# Patient Record
Sex: MALE | Race: WHITE
[De-identification: names, ages, dates, MRNs, and addresses within clinical notes are randomized per-mention and may not be internally consistent; named-entity substitution may affect disease eponyms.]

---

## 2017-03-02 ENCOUNTER — APPOINTMENT (OUTPATIENT)
Dept: ORTHOPEDIC SURGERY | Facility: CLINIC | Age: 57
End: 2017-03-02

## 2017-03-02 VITALS — HEIGHT: 77 IN | BODY MASS INDEX: 28.34 KG/M2 | WEIGHT: 240 LBS | RESPIRATION RATE: 16 BRPM

## 2017-03-02 DIAGNOSIS — Z87.891 PERSONAL HISTORY OF NICOTINE DEPENDENCE: ICD-10-CM

## 2017-03-02 DIAGNOSIS — M72.0 PALMAR FASCIAL FIBROMATOSIS [DUPUYTREN]: ICD-10-CM

## 2017-03-02 DIAGNOSIS — Z00.00 ENCOUNTER FOR GENERAL ADULT MEDICAL EXAMINATION W/OUT ABNORMAL FINDINGS: ICD-10-CM

## 2017-03-02 DIAGNOSIS — Z86.79 PERSONAL HISTORY OF OTHER DISEASES OF THE CIRCULATORY SYSTEM: ICD-10-CM

## 2017-07-17 ENCOUNTER — APPOINTMENT (OUTPATIENT)
Dept: ORTHOPEDIC SURGERY | Facility: CLINIC | Age: 57
End: 2017-07-17

## 2017-07-18 ENCOUNTER — APPOINTMENT (OUTPATIENT)
Dept: ORTHOPEDIC SURGERY | Facility: CLINIC | Age: 57
End: 2017-07-18

## 2017-09-08 ENCOUNTER — APPOINTMENT (OUTPATIENT)
Dept: ORTHOPEDIC SURGERY | Facility: CLINIC | Age: 57
End: 2017-09-08
Payer: COMMERCIAL

## 2017-09-08 PROCEDURE — 99214 OFFICE O/P EST MOD 30 MIN: CPT

## 2019-06-03 ENCOUNTER — APPOINTMENT (OUTPATIENT)
Dept: FAMILY MEDICINE | Facility: CLINIC | Age: 59
End: 2019-06-03
Payer: COMMERCIAL

## 2019-06-03 VITALS — SYSTOLIC BLOOD PRESSURE: 118 MMHG | DIASTOLIC BLOOD PRESSURE: 70 MMHG

## 2019-06-03 VITALS — WEIGHT: 240 LBS | HEIGHT: 77 IN | BODY MASS INDEX: 28.34 KG/M2

## 2019-06-03 DIAGNOSIS — M72.0 PALMAR FASCIAL FIBROMATOSIS [DUPUYTREN]: ICD-10-CM

## 2019-06-03 DIAGNOSIS — Z71.89 OTHER SPECIFIED COUNSELING: ICD-10-CM

## 2019-06-03 PROCEDURE — 90471 IMMUNIZATION ADMIN: CPT

## 2019-06-03 PROCEDURE — 90707 MMR VACCINE SC: CPT

## 2019-06-03 PROCEDURE — 90691 TYPHOID VACCINE IM: CPT

## 2019-06-03 PROCEDURE — 99204 OFFICE O/P NEW MOD 45 MIN: CPT | Mod: 25

## 2019-06-03 PROCEDURE — 90715 TDAP VACCINE 7 YRS/> IM: CPT

## 2019-06-03 PROCEDURE — 90472 IMMUNIZATION ADMIN EACH ADD: CPT

## 2019-06-03 RX ORDER — VALSARTAN 160 MG/1
160 TABLET ORAL
Refills: 0 | Status: ACTIVE | COMMUNITY

## 2019-06-03 RX ORDER — ATOVAQUONE AND PROGUANIL HYDROCHLORIDE 250; 100 MG/1; MG/1
250-100 TABLET, FILM COATED ORAL DAILY
Qty: 23 | Refills: 0 | Status: ACTIVE | COMMUNITY
Start: 2019-06-03 | End: 1900-01-01

## 2019-06-03 NOTE — HISTORY OF PRESENT ILLNESS
[Initial Pre-Travel Evaluation] : an initial pre-travel visit [Travel Duration ___] : will last [unfilled] [Travel Begins ___] : begins [unfilled] [Tourism] : tourism [The Country(ies) of ___] : the country(ies) of [unfilled] [Hotel] : hotel [Tent] : tent [Hepatitis A] : hepatitis A [Polio] : polio [MMR] : measles, mumps, rubella [Typhoid] : typhoid [Rabies] : no rabies [Tetanus] : no tetanus [Yellow Fever] : no yellow fever [de-identified] : 1 dose of MMR, Typoid oral 6 years ago

## 2021-11-02 ENCOUNTER — NON-APPOINTMENT (OUTPATIENT)
Age: 61
End: 2021-11-02

## 2021-11-02 ENCOUNTER — APPOINTMENT (OUTPATIENT)
Dept: CARDIOLOGY | Facility: CLINIC | Age: 61
End: 2021-11-02
Payer: COMMERCIAL

## 2021-11-02 VITALS
WEIGHT: 255 LBS | SYSTOLIC BLOOD PRESSURE: 130 MMHG | DIASTOLIC BLOOD PRESSURE: 72 MMHG | HEIGHT: 77 IN | HEART RATE: 56 BPM | BODY MASS INDEX: 30.11 KG/M2 | TEMPERATURE: 98.2 F | OXYGEN SATURATION: 100 %

## 2021-11-02 PROCEDURE — 93000 ELECTROCARDIOGRAM COMPLETE: CPT

## 2021-11-02 PROCEDURE — 99204 OFFICE O/P NEW MOD 45 MIN: CPT

## 2021-11-02 NOTE — HISTORY OF PRESENT ILLNESS
[FreeTextEntry1] : 61M with HTN, ascending aortic dilation, presents to establish cardiovascular care\par Sent in by: found online\par PMD: Dr Jory Laguna, [facility]\par \par pt had a prev cardiologist in the city, looking for someone closer to his house. \par \par pt states hx of aortic aneurysm a few years ago. pt was being treated for HTN, placed on valsartan 160 and hctz. \par pt also on lipitor 20mg\par \par pt overall feeling well. denies cp or sob, palpitations, dizziness, syncope. pt denies LE edema, orthopnea. \par  \par  \par Exercise: walking daily\par Diet: trying to eat healthy, low acid foods. no meat. \par \par Prior cardiac workup: stress test several years ago, normal per pt\par Recent labs:\par  \par EKG: SR 56\par \par  \par Med hx: hx of panic attacks, ascending aorta dilation. \par Sx hx: pilonidal cyst. \par Fam hx: no known cardiac dz\par Social hx: lives in [place] with wife. 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 no tob, eoth, drugs. \par Meds: valsartan 160 hctz 12.5 lipitor 20 nexium\par Allergies: nkda\par

## 2021-11-02 NOTE — PHYSICAL EXAM
[Well Developed] : well developed [Well Nourished] : well nourished [No Acute Distress] : no acute distress [Normal Venous Pressure] : normal venous pressure [No Carotid Bruit] : no carotid bruit [Normal S1, S2] : normal S1, S2 [No Murmur] : no murmur [Clear Lung Fields] : clear lung fields [Good Air Entry] : good air entry [No Respiratory Distress] : no respiratory distress  [Soft] : abdomen soft [Non Tender] : non-tender [Normal Gait] : normal gait [Moves all extremities] : moves all extremities [No Focal Deficits] : no focal deficits [Alert and Oriented] : alert and oriented

## 2021-11-02 NOTE — DISCUSSION/SUMMARY
[FreeTextEntry1] : \par \par 61M with HTN, ascending aortic dilation, presents to establish cardiovascular care\par \par 1. htn\par -controlled on current regimen of valsartan 160 hctz 12.5\par -cont current meds\par \par 2. Ascending aortic dilation\par -pt reports hx of ascending dilation\par -last echo several years ago\par -will check tte to eval\par -check abd u/s to eval for AAA\par \par f/u 6 months unless requires sooner.

## 2021-11-12 ENCOUNTER — NON-APPOINTMENT (OUTPATIENT)
Age: 61
End: 2021-11-12

## 2021-11-16 ENCOUNTER — APPOINTMENT (OUTPATIENT)
Dept: CARDIOLOGY | Facility: CLINIC | Age: 61
End: 2021-11-16
Payer: COMMERCIAL

## 2021-11-16 PROCEDURE — 93306 TTE W/DOPPLER COMPLETE: CPT

## 2021-11-16 PROCEDURE — 93978 VASCULAR STUDY: CPT

## 2023-01-23 ENCOUNTER — NON-APPOINTMENT (OUTPATIENT)
Age: 63
End: 2023-01-23

## 2023-01-30 ENCOUNTER — APPOINTMENT (OUTPATIENT)
Dept: OTOLARYNGOLOGY | Facility: CLINIC | Age: 63
End: 2023-01-30
Payer: COMMERCIAL

## 2023-01-30 ENCOUNTER — NON-APPOINTMENT (OUTPATIENT)
Age: 63
End: 2023-01-30

## 2023-01-30 VITALS
WEIGHT: 255 LBS | BODY MASS INDEX: 30.11 KG/M2 | SYSTOLIC BLOOD PRESSURE: 122 MMHG | HEIGHT: 77 IN | DIASTOLIC BLOOD PRESSURE: 72 MMHG | HEART RATE: 62 BPM

## 2023-01-30 DIAGNOSIS — H93.13 TINNITUS, BILATERAL: ICD-10-CM

## 2023-01-30 DIAGNOSIS — H61.22 IMPACTED CERUMEN, LEFT EAR: ICD-10-CM

## 2023-01-30 DIAGNOSIS — J31.0 CHRONIC RHINITIS: ICD-10-CM

## 2023-01-30 PROCEDURE — 99203 OFFICE O/P NEW LOW 30 MIN: CPT | Mod: 25

## 2023-01-30 PROCEDURE — 69210 REMOVE IMPACTED EAR WAX UNI: CPT

## 2023-01-30 NOTE — HISTORY OF PRESENT ILLNESS
[de-identified] : Pt presents today c/o earwax buildup that impacts his hearing. Pt notes he tried Debrox, but he thinks there's was too much wax for the peroxide to work. Pt notes he experiences slight tinnitus occasionally, which increases with salt intake, but it doesn't bother him.

## 2023-01-30 NOTE — PHYSICAL EXAM
[Hearing Basurto Test (Tuning Fork On Forehead)] : no lateralization of tone [Normal] : mucosa is normal [Midline] : trachea located in midline position [Removed] : palatine tonsils previously removed [FreeTextEntry8] : cerumen removed via curettage  [FreeTextEntry9] : cerumen impaction removed via curettage  [de-identified] : inflamed turbs  [FreeTextEntry2] : sinuses nontender to percussion. sensations intact.

## 2023-01-30 NOTE — ASSESSMENT
[FreeTextEntry1] : Reviewed and reconciled medications, allergies, PMHx, PSHx, SocHx, FMHx.\par \par Pt presents today c/o earwax buildup that impacts his hearing. \par \par Physical Exam -\par cerumen removed b/l - left impacted\par inflamed turbs \par \par Plan:\par Cerumen removed b/l. FU 1 year.

## 2023-01-30 NOTE — ADDENDUM
[FreeTextEntry1] : Documented by Yenifer Benito acting as scribe for Dr. Vicente on 01/30/2023.\par \par All Medical record entries made by the scribe were at my, Dr. Vicente,direction and personally dictated by me on 01/30/2023. I have reviewed the chart and agree that the record accurately reflects my personal performance of the history, physical exam, assessment and plan. I have also personally directed, reviewed, and agreed with the discharge instructions.

## 2023-01-30 NOTE — CONSULT LETTER
[Dear  ___] : Dear  [unfilled], [Consult Letter:] : I had the pleasure of evaluating your patient, [unfilled]. [Please see my note below.] : Please see my note below. [Consult Closing:] : Thank you very much for allowing me to participate in the care of this patient.  If you have any questions, please do not hesitate to contact me. [Sincerely,] : Sincerely, [FreeTextEntry3] : Santi Vicente MD FACS

## 2023-02-16 ENCOUNTER — NON-APPOINTMENT (OUTPATIENT)
Age: 63
End: 2023-02-16

## 2023-02-16 ENCOUNTER — APPOINTMENT (OUTPATIENT)
Dept: CARDIOLOGY | Facility: CLINIC | Age: 63
End: 2023-02-16
Payer: COMMERCIAL

## 2023-02-16 VITALS
OXYGEN SATURATION: 99 % | HEIGHT: 77 IN | BODY MASS INDEX: 30.71 KG/M2 | SYSTOLIC BLOOD PRESSURE: 128 MMHG | HEART RATE: 57 BPM | DIASTOLIC BLOOD PRESSURE: 80 MMHG | TEMPERATURE: 97.9 F | WEIGHT: 260.06 LBS | RESPIRATION RATE: 16 BRPM

## 2023-02-16 PROCEDURE — 93000 ELECTROCARDIOGRAM COMPLETE: CPT

## 2023-02-16 PROCEDURE — 99215 OFFICE O/P EST HI 40 MIN: CPT

## 2023-02-16 NOTE — DISCUSSION/SUMMARY
[FreeTextEntry1] : 62M with HTN, ascending aortic dilation, presents for f/u\par \par pt feeling well\par no complaints\par exercising daily without issues\par bp controlled, cont current regimen\par will repeat tte to eval aorta\par \par f/u 6 months unless requires sooner\par >40 min spent on complete encounter [EKG obtained to assist in diagnosis and management of assessed problem(s)] : EKG obtained to assist in diagnosis and management of assessed problem(s)

## 2023-02-16 NOTE — HISTORY OF PRESENT ILLNESS
[FreeTextEntry1] : 62M with HTN, ascending aortic dilation, presents for f/u\par PMD: Dr Jory Laguna, [facility]\par \par previously,\par pt last seen here 11/2021 for an initial eval.  pt w/ hx of aortic aneurysm, lost to routine f/u. pt was being treated for HTN, placed on valsartan 160 and hctz. pt also on lipitor 20mg. tte done at that time showing Ao 4.2 cm. \par pt has not been seen since 11/2021.\par \par pt now presents for follow up.\par today,\par \par pt overall feeling well. denies cp or sob, palpitations, dizziness, syncope. pt denies LE edema, orthopnea. \par  \par  \par Exercise: [place], no symptoms. \par Diet: trying to eat healthy, low acid foods. no meat. \par \par Prior cardiac workup: stress test several years ago, normal per pt\par Recent labs:\par  \par EKG: SR 58\par \par  \par Med hx: hx of panic attacks, ascending aorta dilation. \par Sx hx: pilonidal cyst. \par Fam hx: no known cardiac dz\par Social hx: lives in [place] with wife. 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 no tob, eoth, drugs. \par Meds: valsartan 160 hctz 12.5 lipitor 20 nexium\par Allergies: nkda\par

## 2023-02-21 ENCOUNTER — TRANSCRIPTION ENCOUNTER (OUTPATIENT)
Age: 63
End: 2023-02-21

## 2023-02-28 ENCOUNTER — APPOINTMENT (OUTPATIENT)
Dept: CARDIOLOGY | Facility: CLINIC | Age: 63
End: 2023-02-28
Payer: COMMERCIAL

## 2023-02-28 PROCEDURE — 93306 TTE W/DOPPLER COMPLETE: CPT

## 2023-08-22 ENCOUNTER — APPOINTMENT (OUTPATIENT)
Dept: CARDIOLOGY | Facility: CLINIC | Age: 63
End: 2023-08-22
Payer: COMMERCIAL

## 2023-08-22 ENCOUNTER — NON-APPOINTMENT (OUTPATIENT)
Age: 63
End: 2023-08-22

## 2023-08-22 VITALS
DIASTOLIC BLOOD PRESSURE: 68 MMHG | TEMPERATURE: 97.9 F | BODY MASS INDEX: 30.34 KG/M2 | SYSTOLIC BLOOD PRESSURE: 117 MMHG | OXYGEN SATURATION: 99 % | HEART RATE: 61 BPM | HEIGHT: 77 IN | WEIGHT: 257 LBS

## 2023-08-22 DIAGNOSIS — I77.810 THORACIC AORTIC ECTASIA: ICD-10-CM

## 2023-08-22 DIAGNOSIS — I10 ESSENTIAL (PRIMARY) HYPERTENSION: ICD-10-CM

## 2023-08-22 DIAGNOSIS — R00.2 PALPITATIONS: ICD-10-CM

## 2023-08-22 PROCEDURE — 93000 ELECTROCARDIOGRAM COMPLETE: CPT

## 2023-08-22 PROCEDURE — 99215 OFFICE O/P EST HI 40 MIN: CPT

## 2023-08-22 NOTE — DISCUSSION/SUMMARY
[FreeTextEntry1] : 62M with HTN, ascending aortic dilation, presents for f/u  pt feeling well exercising daily without issues bp controlled, cont current regimen occasional palpitations, apple watch showing afib ekg today with SR will check extended holter to assess for arrythmia  f/u after holter.  >40 min spent on complete encounter [EKG obtained to assist in diagnosis and management of assessed problem(s)] : EKG obtained to assist in diagnosis and management of assessed problem(s)

## 2023-08-22 NOTE — HISTORY OF PRESENT ILLNESS
[FreeTextEntry1] : 62M with HTN, ascending aortic dilation, presents for f/u PMD: Dr Jory Laguna [facility]  previously, pt last seen here 11/2021 for an initial eval. pt w/ hx of aortic aneurysm, lost to routine f/u. pt was being treated for HTN, placed on valsartan 160 and hctz. pt also on lipitor 20mg. tte done at that time showing Ao 4.2 cm. pt has not been seen since 11/2021 last seen 2/23, feelling well.  exercising daily without issues. repeat tte for tortuous aorta showing no change from prev.   pt now presents for follow up. today,  pt states he is feeling well, no issues, no symptoms. walking regularly, no complaints. pt states his apple watch told him that he was having afib. pt states occasional palpitations, have always ignored it. states this time he had it after drinking some Gatorade.  pt states it lasted for a few minutes and then resolved on its own.  Rythm strip from watch from episode showing afib.    pt overall feeling well. denies cp or sob, palpitations, dizziness, syncope. pt denies LE edema, orthopnea.  Exercise: walking daily 4-7 miles [place] the beach, no symptoms. Diet: trying to eat healthy, low acid foods. no meat.  Prior cardiac workup: stress test several years ago, normal per pt Recent labs:  EKG: SR 54  Med hx: hx of panic attacks, ascending aorta dilation. Sx hx: pilonidal cyst. Fam hx: no known cardiac dz Social hx: lives in [place] with wife. 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 no tob, eoth, drugs. Meds: valsartan 160 hctz 12.5 lipitor 20 nexium Allergies: nkda

## 2023-08-22 NOTE — REVIEW OF SYSTEMS
[Fever] : no fever [Headache] : no headache [Weight Gain (___ Lbs)] : no recent weight gain [Chills] : no chills [Feeling Fatigued] : not feeling fatigued [Weight Loss (___ Lbs)] : no recent weight loss [Blurry Vision] : no blurred vision [Sore Throat] : no sore throat [SOB] : no shortness of breath [Dyspnea on exertion] : not dyspnea during exertion [Chest Discomfort] : no chest discomfort [Lower Ext Edema] : no extremity edema [Palpitations] : palpitations [Orthopnea] : no orthopnea [Syncope] : no syncope [Cough] : no cough [Wheezing] : no wheezing [Nausea] : no nausea [Vomiting] : no vomiting [Dizziness] : no dizziness [Confusion] : no confusion was observed [Easy Bleeding] : no tendency for easy bleeding [Easy Bruising] : no tendency for easy bruising

## 2024-04-15 RX ORDER — VALSARTAN AND HYDROCHLOROTHIAZIDE 160; 12.5 MG/1; MG/1
160-12.5 TABLET, FILM COATED ORAL
Qty: 90 | Refills: 1 | Status: ACTIVE | COMMUNITY
Start: 2024-04-12 | End: 1900-01-01

## 2024-04-16 ENCOUNTER — TRANSCRIPTION ENCOUNTER (OUTPATIENT)
Age: 64
End: 2024-04-16

## 2024-04-16 RX ORDER — ATORVASTATIN CALCIUM 20 MG/1
20 TABLET, FILM COATED ORAL
Qty: 90 | Refills: 1 | Status: ACTIVE | COMMUNITY
Start: 1900-01-01 | End: 1900-01-01

## 2024-08-28 ENCOUNTER — APPOINTMENT (OUTPATIENT)
Dept: CARDIOLOGY | Facility: CLINIC | Age: 64
End: 2024-08-28
Payer: COMMERCIAL

## 2024-08-28 ENCOUNTER — NON-APPOINTMENT (OUTPATIENT)
Age: 64
End: 2024-08-28

## 2024-08-28 VITALS
TEMPERATURE: 98.6 F | HEART RATE: 67 BPM | WEIGHT: 253 LBS | BODY MASS INDEX: 29.87 KG/M2 | SYSTOLIC BLOOD PRESSURE: 128 MMHG | DIASTOLIC BLOOD PRESSURE: 75 MMHG | HEIGHT: 77 IN | OXYGEN SATURATION: 98 %

## 2024-08-28 DIAGNOSIS — I77.810 THORACIC AORTIC ECTASIA: ICD-10-CM

## 2024-08-28 PROCEDURE — 99215 OFFICE O/P EST HI 40 MIN: CPT

## 2024-08-28 PROCEDURE — G2211 COMPLEX E/M VISIT ADD ON: CPT | Mod: NC

## 2024-08-28 NOTE — DISCUSSION/SUMMARY
[FreeTextEntry1] : 63M with HTN, ascending aortic dilation, afib presents for f/u  pt feeling well exercising daily without issues bp controlled, cont current regimen occasional palpitations, apple watch showing afib ekg today with SR given symptomatic nature of symptoms, will refer to EP  f/u after ep eval 40 min spent on complete encounter

## 2024-08-28 NOTE — HISTORY OF PRESENT ILLNESS
[FreeTextEntry1] : 63M with HTN, ascending aortic dilation, afib presents for f/u PMD: Dr Jory Laguna [facility]  previously, pt last seen here 11/2021 for an initial eval. pt w/ hx of aortic aneurysm, lost to routine f/u. pt was being treated for HTN, placed on valsartan 160 and hctz. pt also on lipitor 20mg. tte done at that time showing Ao 4.2 cm. pt has not been seen since 11/2021 2/23, feeling well. exercising daily without issues. repeat tte for tortuous aorta showing no change from prev. last seen 8/23, feeling well. zio showing no afib but 1 run of NSVT 9 beats  pt now presents for follow up. today,  pt states this morning he had a sensation in his chest, similar to his prev afib events and he took an ekg on his apple watch which showed HR up to 170s with afib. whole episode lasted until noon today. (pt states increased caffeine intake last night (more than his baseline)  currently pt feeling well, no complaints. denies cp or sob, palpitations, dizziness, syncope. pt denies LE edema, orthopnea.  Exercise: walking daily 4[place]7 miles [place] the beach, no symptoms. Diet: trying to eat healthy, low acid foods. no meat.  Prior cardiac workup: stress test several years ago, normal per pt Recent labs:  EKG: SR 68  Med hx: hx of panic attacks, ascending aorta dilation. Sx hx: pilonidal cyst. Fam hx: no known cardiac dz Social hx: lives in [place] with wife. 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 no tob, eoth, drugs. Meds: valsartan 160 hctz 12.5 lipitor 20 nexium Allergies: nkda

## 2024-08-29 ENCOUNTER — NON-APPOINTMENT (OUTPATIENT)
Age: 64
End: 2024-08-29

## 2024-08-30 ENCOUNTER — APPOINTMENT (OUTPATIENT)
Dept: ELECTROPHYSIOLOGY | Facility: CLINIC | Age: 64
End: 2024-08-30
Payer: COMMERCIAL

## 2024-08-30 VITALS
HEART RATE: 68 BPM | WEIGHT: 253 LBS | BODY MASS INDEX: 29.87 KG/M2 | SYSTOLIC BLOOD PRESSURE: 104 MMHG | HEIGHT: 77 IN | OXYGEN SATURATION: 98 % | DIASTOLIC BLOOD PRESSURE: 71 MMHG

## 2024-08-30 PROCEDURE — 93000 ELECTROCARDIOGRAM COMPLETE: CPT

## 2024-08-30 PROCEDURE — 99204 OFFICE O/P NEW MOD 45 MIN: CPT

## 2024-08-30 NOTE — CARDIOLOGY SUMMARY
[de-identified] : ECG from 8/28/2024: Sinus at 68bpm [de-identified] : TTE from 2/28/2023: LV not well visualized, EF: 60-65%, concentric LV remodeling, normal LA, mild dilation of the proximal ascending aorta, normal RA, no pericardial effusion

## 2024-08-30 NOTE — HISTORY OF PRESENT ILLNESS
[FreeTextEntry1] : Mr. Nehemiah Juarez is a 63-year-old man with an ascending aortic aneurysm and paroxysmal atrial fibrillation. He presents today for an initial evaluation.  The patient reports that he has had episodes of atrial fibrillation three times in the past year. The longest episode lasted approximately 12 hours. Each of the three episodes spontaneously terminated. He is listed as having a history of hypertension. The patient reports that his antihypertensive medication was prescribed due to his ascending aortic aneurysm. His symptoms during atrial fibrillation include indigestion, palpitations. He has confirmed that he is in atrial fibrillation using his Apple watch. On my review of one of the available tracings I agree with the diagnosis. He has also had two episodes of dizziness in the past year. These occurred while standing for an hour. He works as a . No toxic habits. No reports of snoring.   CHASA2-VASC: 0-1 - he will turn 64 in one month

## 2024-08-30 NOTE — PHYSICAL EXAM
[Well Developed] : well developed [Well Nourished] : well nourished [No Acute Distress] : no acute distress [Normal Venous Pressure] : normal venous pressure [Normal S1, S2] : normal S1, S2 [No Murmur] : no murmur [No Rub] : no rub [No Gallop] : no gallop [Clear Lung Fields] : clear lung fields [Good Air Entry] : good air entry [No Respiratory Distress] : no respiratory distress  [Soft] : abdomen soft [Non Tender] : non-tender [No Edema] : no edema [No Cyanosis] : no cyanosis [No Rash] : no rash [Moves all extremities] : moves all extremities [Normal Speech] : normal speech [Alert and Oriented] : alert and oriented [Normal memory] : normal memory

## 2024-08-30 NOTE — CARDIOLOGY SUMMARY
[de-identified] : ECG from 8/28/2024: Sinus at 68bpm [de-identified] : TTE from 2/28/2023: LV not well visualized, EF: 60-65%, concentric LV remodeling, normal LA, mild dilation of the proximal ascending aorta, normal RA, no pericardial effusion

## 2024-08-30 NOTE — DISCUSSION/SUMMARY
[FreeTextEntry1] : Mr. Nehemiah Juarez is a 63-year-old man with an ascending aortic aneurysm and paroxysmal atrial fibrillation. He presents today for an initial evaluation.  With regards to the patient's symptomatic paroxysmal in the setting of preserved left ventricular systolic function, we discussed the following management strategies: 1) observation and ventricular rate control only, 2) antiarrhythmic drugs to suppress AF, or 3) catheter ablation of the AF. We also discussed risk factor modification, including assessment and treatment of possible sleep apnea (no reports of snoring, sleep study not ordered), managing stress / weight, and limiting caffeine intake. He does not drink alcohol.  We discussed that antiarrhythmic drug therapy is expected to reduce AF recurrences by approximately 50% in all-comers, although this number may be higher or lower for individual patients and trials of multiple drugs may be necessary to find the optimal medications. Side effects of these medications including the risk of proarrhythmic effects were reviewed.     We reviewed the risks and benefits of catheter ablation. The procedure would likely result in a significant reduction in AF burden, with the chances of AF remission following a single procedure at approximately 80% at 1 year. In the long-term, risk factor modification, repeat ablation, and/or antiarrhythmic drug therapy may be necessary to suppress AF recurrences due to the complex nature of AF mechanisms and sometimes new triggers or drivers of AF. I also explained that in addition to complications related to anesthesia, the ablation procedure carries a 1% risk of complications including, but not limited to: vascular injury, VTE, MI, cardiac perforation There is also a 0.2% risk of a potentially catastrophic complication including stroke or even death. We also reviewed the need for anticoagulation both before and after the ablation.   Mr. Juarez will call the office if he decides to move forward with an ablation or would like to start an antiarrhythmic medication. He will continue to monitor his burden of atrial fibrillation using his Apple watch.  [EKG obtained to assist in diagnosis and management of assessed problem(s)] : EKG obtained to assist in diagnosis and management of assessed problem(s)

## 2024-08-30 NOTE — DISCUSSION/SUMMARY
[EKG obtained to assist in diagnosis and management of assessed problem(s)] : EKG obtained to assist in diagnosis and management of assessed problem(s) [FreeTextEntry1] : Mr. Nehemiah Juarez is a 63-year-old man with an ascending aortic aneurysm and paroxysmal atrial fibrillation. He presents today for an initial evaluation.  With regards to the patient's symptomatic paroxysmal in the setting of preserved left ventricular systolic function, we discussed the following management strategies: 1) observation and ventricular rate control only, 2) antiarrhythmic drugs to suppress AF, or 3) catheter ablation of the AF. We also discussed risk factor modification, including assessment and treatment of possible sleep apnea (no reports of snoring, sleep study not ordered), managing stress / weight, and limiting caffeine intake. He does not drink alcohol.  We discussed that antiarrhythmic drug therapy is expected to reduce AF recurrences by approximately 50% in all-comers, although this number may be higher or lower for individual patients and trials of multiple drugs may be necessary to find the optimal medications. Side effects of these medications including the risk of proarrhythmic effects were reviewed.     We reviewed the risks and benefits of catheter ablation. The procedure would likely result in a significant reduction in AF burden, with the chances of AF remission following a single procedure at approximately 80% at 1 year. In the long-term, risk factor modification, repeat ablation, and/or antiarrhythmic drug therapy may be necessary to suppress AF recurrences due to the complex nature of AF mechanisms and sometimes new triggers or drivers of AF. I also explained that in addition to complications related to anesthesia, the ablation procedure carries a 1% risk of complications including, but not limited to: vascular injury, VTE, MI, cardiac perforation There is also a 0.2% risk of a potentially catastrophic complication including stroke or even death. We also reviewed the need for anticoagulation both before and after the ablation.   Mr. Juarez will call the office if he decides to move forward with an ablation or would like to start an antiarrhythmic medication. He will continue to monitor his burden of atrial fibrillation using his Apple watch.

## 2024-09-04 ENCOUNTER — NON-APPOINTMENT (OUTPATIENT)
Age: 64
End: 2024-09-04

## 2024-09-04 ENCOUNTER — APPOINTMENT (OUTPATIENT)
Dept: CARDIOLOGY | Facility: CLINIC | Age: 64
End: 2024-09-04
Payer: COMMERCIAL

## 2024-09-04 DIAGNOSIS — R00.2 PALPITATIONS: ICD-10-CM

## 2024-09-04 DIAGNOSIS — I48.0 PAROXYSMAL ATRIAL FIBRILLATION: ICD-10-CM

## 2024-09-04 DIAGNOSIS — I10 ESSENTIAL (PRIMARY) HYPERTENSION: ICD-10-CM

## 2024-09-04 PROCEDURE — 99215 OFFICE O/P EST HI 40 MIN: CPT

## 2024-09-04 PROCEDURE — G2211 COMPLEX E/M VISIT ADD ON: CPT | Mod: NC

## 2024-09-04 PROCEDURE — 93000 ELECTROCARDIOGRAM COMPLETE: CPT

## 2024-09-04 NOTE — HISTORY OF PRESENT ILLNESS
[FreeTextEntry1] : 63M with HTN, ascending aortic dilation, afib presents for f/u PMD: Dr Jory Laguna, [facility] EP: Dr Marroquin   previously, pt last seen here 11/2021 for an initial eval. pt w/ hx of aortic aneurysm, lost to routine f/u. pt was being treated for HTN, placed on valsartan 160 and hctz. pt also on lipitor 20mg. tte done at that time showing Ao 4.2 cm. pt has not been seen since 11/2021 2/23, feeling well. exercising daily without issues. repeat tte for tortuous aorta showing no change from prev. 8/23, feeling well. zio showing no afib but 1 run of NSVT 9 beats last seen 8/24 endorsing palpitations, EKG showing SR, pt sent to EPS, offered meds/ablation, pt wishes to consider all options, but will hold off on any decisions for now  pt now presents for follow up. today,  pt states he has been noting his HR has been high occasionally over the past few days, no cp sob palpitations dizziness syncope. pt only endorsing anxiety. denies having a sensation in his chest like he has had previously, (see anthony)   currently pt feeling well, no complaints. denies cp or sob, palpitations, dizziness, syncope. pt denies LE edema, orthopnea.  Exercise: walking daily [place], no symptoms. Diet: trying to eat healthy, low acid foods. no meat.  Prior cardiac workup: stress test several years ago, normal per pt Recent labs:  EKG: SR 63  Med hx: hx of panic attacks, ascending aorta dilation. Sx hx: pilonidal cyst. Fam hx: no known cardiac dz Social hx: lives in [place] with wife. 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 no tob, eoth, drugs. Meds: valsartan 160 hctz 12.5 lipitor 20 nexium Allergies: nkda

## 2024-09-04 NOTE — DISCUSSION/SUMMARY
[FreeTextEntry1] : 63M with HTN, ascending aortic dilation, afib presents for f/u  pt feeling well exercising daily without issues bp controlled, cont current regimen occasional palpitations, apple watch showing afib ekg today with SR following with ep, considering meds vs ablation   f/u 3 months 40 min spent on complete encounter [EKG obtained to assist in diagnosis and management of assessed problem(s)] : EKG obtained to assist in diagnosis and management of assessed problem(s)

## 2024-10-09 ENCOUNTER — RX RENEWAL (OUTPATIENT)
Age: 64
End: 2024-10-09

## 2024-12-06 ENCOUNTER — APPOINTMENT (OUTPATIENT)
Dept: CARDIOLOGY | Facility: CLINIC | Age: 64
End: 2024-12-06
Payer: COMMERCIAL

## 2024-12-06 VITALS
HEART RATE: 76 BPM | OXYGEN SATURATION: 97 % | BODY MASS INDEX: 27.98 KG/M2 | WEIGHT: 237 LBS | RESPIRATION RATE: 16 BRPM | DIASTOLIC BLOOD PRESSURE: 67 MMHG | SYSTOLIC BLOOD PRESSURE: 110 MMHG | HEIGHT: 77 IN | TEMPERATURE: 97.7 F

## 2024-12-06 DIAGNOSIS — I48.0 PAROXYSMAL ATRIAL FIBRILLATION: ICD-10-CM

## 2024-12-06 DIAGNOSIS — I77.810 THORACIC AORTIC ECTASIA: ICD-10-CM

## 2024-12-06 DIAGNOSIS — I10 ESSENTIAL (PRIMARY) HYPERTENSION: ICD-10-CM

## 2024-12-06 PROCEDURE — G2211 COMPLEX E/M VISIT ADD ON: CPT | Mod: NC

## 2024-12-06 PROCEDURE — 99215 OFFICE O/P EST HI 40 MIN: CPT

## 2025-01-03 ENCOUNTER — APPOINTMENT (OUTPATIENT)
Dept: ELECTROPHYSIOLOGY | Facility: CLINIC | Age: 65
End: 2025-01-03
Payer: MEDICARE

## 2025-01-03 ENCOUNTER — NON-APPOINTMENT (OUTPATIENT)
Age: 65
End: 2025-01-03

## 2025-01-03 VITALS
WEIGHT: 240 LBS | HEART RATE: 66 BPM | OXYGEN SATURATION: 98 % | SYSTOLIC BLOOD PRESSURE: 111 MMHG | DIASTOLIC BLOOD PRESSURE: 72 MMHG | BODY MASS INDEX: 28.34 KG/M2 | HEIGHT: 77 IN

## 2025-01-03 DIAGNOSIS — I48.0 PAROXYSMAL ATRIAL FIBRILLATION: ICD-10-CM

## 2025-01-03 PROCEDURE — 93000 ELECTROCARDIOGRAM COMPLETE: CPT

## 2025-01-03 PROCEDURE — 99205 OFFICE O/P NEW HI 60 MIN: CPT

## 2025-01-03 RX ORDER — ASPIRIN ENTERIC COATED TABLETS 81 MG 81 MG/1
81 TABLET, DELAYED RELEASE ORAL
Qty: 90 | Refills: 3 | Status: ACTIVE | COMMUNITY
Start: 2025-01-03

## 2025-01-03 RX ORDER — APIXABAN 5 MG/1
5 TABLET, FILM COATED ORAL
Qty: 180 | Refills: 2 | Status: ACTIVE | COMMUNITY
Start: 2025-01-03 | End: 1900-01-01

## 2025-01-13 ENCOUNTER — APPOINTMENT (OUTPATIENT)
Dept: CARDIOLOGY | Facility: CLINIC | Age: 65
End: 2025-01-13
Payer: COMMERCIAL

## 2025-01-13 PROCEDURE — 93306 TTE W/DOPPLER COMPLETE: CPT

## 2025-02-05 ENCOUNTER — NON-APPOINTMENT (OUTPATIENT)
Age: 65
End: 2025-02-05

## 2025-02-14 ENCOUNTER — OUTPATIENT (OUTPATIENT)
Dept: OUTPATIENT SERVICES | Facility: HOSPITAL | Age: 65
LOS: 1 days | End: 2025-02-14
Payer: MEDICARE

## 2025-02-14 ENCOUNTER — NON-APPOINTMENT (OUTPATIENT)
Age: 65
End: 2025-02-14

## 2025-02-14 VITALS
OXYGEN SATURATION: 96 % | RESPIRATION RATE: 16 BRPM | SYSTOLIC BLOOD PRESSURE: 144 MMHG | HEART RATE: 66 BPM | HEIGHT: 77 IN | DIASTOLIC BLOOD PRESSURE: 74 MMHG | WEIGHT: 242.95 LBS | TEMPERATURE: 98 F

## 2025-02-14 DIAGNOSIS — Z98.890 OTHER SPECIFIED POSTPROCEDURAL STATES: Chronic | ICD-10-CM

## 2025-02-14 DIAGNOSIS — Z01.818 ENCOUNTER FOR OTHER PREPROCEDURAL EXAMINATION: ICD-10-CM

## 2025-02-14 DIAGNOSIS — I48.0 PAROXYSMAL ATRIAL FIBRILLATION: ICD-10-CM

## 2025-02-14 DIAGNOSIS — I10 ESSENTIAL (PRIMARY) HYPERTENSION: ICD-10-CM

## 2025-02-14 LAB
ANION GAP SERPL CALC-SCNC: 12 MMOL/L — SIGNIFICANT CHANGE UP (ref 5–17)
BLD GP AB SCN SERPL QL: NEGATIVE — SIGNIFICANT CHANGE UP
BUN SERPL-MCNC: 12 MG/DL — SIGNIFICANT CHANGE UP (ref 7–23)
CALCIUM SERPL-MCNC: 9.8 MG/DL — SIGNIFICANT CHANGE UP (ref 8.4–10.5)
CHLORIDE SERPL-SCNC: 102 MMOL/L — SIGNIFICANT CHANGE UP (ref 96–108)
CO2 SERPL-SCNC: 26 MMOL/L — SIGNIFICANT CHANGE UP (ref 22–31)
CREAT SERPL-MCNC: 1.09 MG/DL — SIGNIFICANT CHANGE UP (ref 0.5–1.3)
EGFR: 75 ML/MIN/1.73M2 — SIGNIFICANT CHANGE UP
GLUCOSE SERPL-MCNC: 100 MG/DL — HIGH (ref 70–99)
HCT VFR BLD CALC: 40.8 % — SIGNIFICANT CHANGE UP (ref 39–50)
HGB BLD-MCNC: 13.8 G/DL — SIGNIFICANT CHANGE UP (ref 13–17)
MCHC RBC-ENTMCNC: 31.6 PG — SIGNIFICANT CHANGE UP (ref 27–34)
MCHC RBC-ENTMCNC: 33.8 G/DL — SIGNIFICANT CHANGE UP (ref 32–36)
MCV RBC AUTO: 93.4 FL — SIGNIFICANT CHANGE UP (ref 80–100)
NRBC BLD AUTO-RTO: 0 /100 WBCS — SIGNIFICANT CHANGE UP (ref 0–0)
PLATELET # BLD AUTO: 168 K/UL — SIGNIFICANT CHANGE UP (ref 150–400)
POTASSIUM SERPL-MCNC: 4 MMOL/L — SIGNIFICANT CHANGE UP (ref 3.5–5.3)
POTASSIUM SERPL-SCNC: 4 MMOL/L — SIGNIFICANT CHANGE UP (ref 3.5–5.3)
RBC # BLD: 4.37 M/UL — SIGNIFICANT CHANGE UP (ref 4.2–5.8)
RBC # FLD: 12 % — SIGNIFICANT CHANGE UP (ref 10.3–14.5)
RH IG SCN BLD-IMP: POSITIVE — SIGNIFICANT CHANGE UP
SODIUM SERPL-SCNC: 140 MMOL/L — SIGNIFICANT CHANGE UP (ref 135–145)
WBC # BLD: 3.33 K/UL — LOW (ref 3.8–10.5)
WBC # FLD AUTO: 3.33 K/UL — LOW (ref 3.8–10.5)

## 2025-02-14 PROCEDURE — 85027 COMPLETE CBC AUTOMATED: CPT

## 2025-02-14 PROCEDURE — G0463: CPT

## 2025-02-14 PROCEDURE — 86901 BLOOD TYPING SEROLOGIC RH(D): CPT

## 2025-02-14 PROCEDURE — 80048 BASIC METABOLIC PNL TOTAL CA: CPT

## 2025-02-14 PROCEDURE — 86850 RBC ANTIBODY SCREEN: CPT

## 2025-02-14 PROCEDURE — 86900 BLOOD TYPING SEROLOGIC ABO: CPT

## 2025-02-14 NOTE — H&P PST ADULT - ASSESSMENT
DASI score: 7.8  DASI activity: walking, goes to gym twice a day  Loose teeth or denture: no  MP: 2

## 2025-02-14 NOTE — H&P PST ADULT - HISTORY OF PRESENT ILLNESS
65 y/o male with PMHx of HTN, Dilated ascending aorta (4.20 cm), HLD, Paroxysmal Afib on Eliquis Reports having my frequent severe symptoms of Afib such as shortness of breath, dizziness fatigue. Patient scheduled for AFib Ablation, Loop Implant with Dr. Marroquin on 02/202/2025.  63 y/o male with PMHx of HTN, HLD, Dilated ascending aorta (4.20 cm), Paroxysmal Afib on Eliquis Reports experiencing increasing frequent episodes of Afib w/ associated shortness of breath, dizziness, and fatigue. Patient scheduled for AFIB Ablation, Loop Implant with Dr. Marroquin on 02/202/2025.

## 2025-02-14 NOTE — H&P PST ADULT - NSICDXPASTMEDICALHX_GEN_ALL_CORE_FT
PAST MEDICAL HISTORY:  Dilated aortic root     HTN (hypertension)     Paroxysmal atrial fibrillation      PAST MEDICAL HISTORY:  Dilated aortic root     History of pilonidal cyst     HLD (hyperlipidemia)     HTN (hypertension)     Paroxysmal atrial fibrillation

## 2025-02-14 NOTE — H&P PST ADULT - NSICDXPASTSURGICALHX_GEN_ALL_CORE_FT
PAST SURGICAL HISTORY:  H/O colonoscopy     History of endoscopy     History of surgical removal of pilonidal cyst

## 2025-02-14 NOTE — H&P PST ADULT - NSANTHOSAYNRD_GEN_A_CORE
No. GIOVANI screening performed.  STOP BANG Legend: 0-2 = LOW Risk; 3-4 = INTERMEDIATE Risk; 5-8 = HIGH Risk

## 2025-02-14 NOTE — H&P PST ADULT - PROBLEM SELECTOR PLAN 1
Scheduled for Afib Ablation, loop recorder implant on 02/202/2025. Scheduled for Afib Ablation, loop recorder implant on 02/20/2025.  Pre-procedure labs collected. Surgical soap given to patient. PST instructions provided. Patient verbalized understanding of instructions.     Per  Sheet. Eliquis Hold on DOS. Last dose 02/19/2025 in the evening.

## 2025-02-20 ENCOUNTER — TRANSCRIPTION ENCOUNTER (OUTPATIENT)
Age: 65
End: 2025-02-20

## 2025-02-20 ENCOUNTER — OUTPATIENT (OUTPATIENT)
Dept: OUTPATIENT SERVICES | Facility: HOSPITAL | Age: 65
LOS: 1 days | End: 2025-02-20
Payer: MEDICARE

## 2025-02-20 VITALS
RESPIRATION RATE: 16 BRPM | OXYGEN SATURATION: 97 % | SYSTOLIC BLOOD PRESSURE: 134 MMHG | DIASTOLIC BLOOD PRESSURE: 66 MMHG | WEIGHT: 240.08 LBS | HEART RATE: 69 BPM | TEMPERATURE: 98 F | HEIGHT: 77 IN

## 2025-02-20 VITALS
OXYGEN SATURATION: 96 % | SYSTOLIC BLOOD PRESSURE: 104 MMHG | RESPIRATION RATE: 15 BRPM | HEART RATE: 73 BPM | DIASTOLIC BLOOD PRESSURE: 66 MMHG

## 2025-02-20 DIAGNOSIS — I48.0 PAROXYSMAL ATRIAL FIBRILLATION: ICD-10-CM

## 2025-02-20 DIAGNOSIS — Z98.890 OTHER SPECIFIED POSTPROCEDURAL STATES: Chronic | ICD-10-CM

## 2025-02-20 LAB
ALBUMIN SERPL ELPH-MCNC: 4.1 G/DL — SIGNIFICANT CHANGE UP (ref 3.3–5)
ALP SERPL-CCNC: 74 U/L — SIGNIFICANT CHANGE UP (ref 40–120)
ALT FLD-CCNC: 29 U/L — SIGNIFICANT CHANGE UP (ref 10–45)
ANION GAP SERPL CALC-SCNC: 12 MMOL/L — SIGNIFICANT CHANGE UP (ref 5–17)
AST SERPL-CCNC: 58 U/L — HIGH (ref 10–40)
BILIRUB SERPL-MCNC: 2 MG/DL — HIGH (ref 0.2–1.2)
BUN SERPL-MCNC: 12 MG/DL — SIGNIFICANT CHANGE UP (ref 7–23)
CALCIUM SERPL-MCNC: 9.3 MG/DL — SIGNIFICANT CHANGE UP (ref 8.4–10.5)
CHLORIDE SERPL-SCNC: 104 MMOL/L — SIGNIFICANT CHANGE UP (ref 96–108)
CO2 SERPL-SCNC: 27 MMOL/L — SIGNIFICANT CHANGE UP (ref 22–31)
CREAT SERPL-MCNC: 1.07 MG/DL — SIGNIFICANT CHANGE UP (ref 0.5–1.3)
EGFR: 77 ML/MIN/1.73M2 — SIGNIFICANT CHANGE UP
GLUCOSE SERPL-MCNC: 130 MG/DL — HIGH (ref 70–99)
HCT VFR BLD CALC: 40.5 % — SIGNIFICANT CHANGE UP (ref 39–50)
HGB BLD-MCNC: 13.7 G/DL — SIGNIFICANT CHANGE UP (ref 13–17)
MCHC RBC-ENTMCNC: 31.5 PG — SIGNIFICANT CHANGE UP (ref 27–34)
MCHC RBC-ENTMCNC: 33.8 G/DL — SIGNIFICANT CHANGE UP (ref 32–36)
MCV RBC AUTO: 93.1 FL — SIGNIFICANT CHANGE UP (ref 80–100)
NRBC BLD AUTO-RTO: 0 /100 WBCS — SIGNIFICANT CHANGE UP (ref 0–0)
PLATELET # BLD AUTO: 164 K/UL — SIGNIFICANT CHANGE UP (ref 150–400)
POTASSIUM SERPL-MCNC: 4.4 MMOL/L — SIGNIFICANT CHANGE UP (ref 3.5–5.3)
POTASSIUM SERPL-SCNC: 4.4 MMOL/L — SIGNIFICANT CHANGE UP (ref 3.5–5.3)
PROT SERPL-MCNC: 6.7 G/DL — SIGNIFICANT CHANGE UP (ref 6–8.3)
RBC # BLD: 4.35 M/UL — SIGNIFICANT CHANGE UP (ref 4.2–5.8)
RBC # FLD: 12.1 % — SIGNIFICANT CHANGE UP (ref 10.3–14.5)
SODIUM SERPL-SCNC: 143 MMOL/L — SIGNIFICANT CHANGE UP (ref 135–145)
WBC # BLD: 8.77 K/UL — SIGNIFICANT CHANGE UP (ref 3.8–10.5)
WBC # FLD AUTO: 8.77 K/UL — SIGNIFICANT CHANGE UP (ref 3.8–10.5)

## 2025-02-20 PROCEDURE — 85027 COMPLETE CBC AUTOMATED: CPT

## 2025-02-20 PROCEDURE — 93010 ELECTROCARDIOGRAM REPORT: CPT | Mod: 76

## 2025-02-20 PROCEDURE — 80053 COMPREHEN METABOLIC PANEL: CPT

## 2025-02-20 PROCEDURE — C1732: CPT

## 2025-02-20 PROCEDURE — 93656 COMPRE EP EVAL ABLTJ ATR FIB: CPT

## 2025-02-20 PROCEDURE — C1733: CPT

## 2025-02-20 PROCEDURE — C1769: CPT

## 2025-02-20 PROCEDURE — C1764: CPT

## 2025-02-20 PROCEDURE — 93657 TX L/R ATRIAL FIB ADDL: CPT

## 2025-02-20 PROCEDURE — C1766: CPT

## 2025-02-20 PROCEDURE — 33285 INSJ SUBQ CAR RHYTHM MNTR: CPT

## 2025-02-20 PROCEDURE — C9399: CPT

## 2025-02-20 PROCEDURE — C1893: CPT

## 2025-02-20 PROCEDURE — C1894: CPT

## 2025-02-20 PROCEDURE — 93005 ELECTROCARDIOGRAM TRACING: CPT

## 2025-02-20 PROCEDURE — C1759: CPT

## 2025-02-20 PROCEDURE — C1730: CPT

## 2025-02-20 RX ORDER — ATORVASTATIN CALCIUM 80 MG/1
1 TABLET, FILM COATED ORAL
Refills: 0 | DISCHARGE

## 2025-02-20 RX ORDER — APIXABAN 5 MG/1
1 TABLET, FILM COATED ORAL
Qty: 0 | Refills: 0 | DISCHARGE

## 2025-02-20 RX ORDER — APIXABAN 2.5 MG/1
5 TABLET, FILM COATED ORAL EVERY 12 HOURS
Refills: 0 | Status: ACTIVE | OUTPATIENT
Start: 2025-02-20 | End: 2026-01-19

## 2025-02-20 RX ORDER — VALSARTAN AND HYDROCHLOROTHIAZIDE 320; 12.5 MG/1; MG/1
1 TABLET, FILM COATED ORAL
Refills: 0 | DISCHARGE

## 2025-02-20 NOTE — ASU DISCHARGE PLAN (ADULT/PEDIATRIC) - FINANCIAL ASSISTANCE
[facility] provides services at a reduced cost to those who are determined to be eligible through [facility]’s financial assistance program. Information regarding [facility]’s financial assistance program can be found by going to [email or web address] or by calling [phone].

## 2025-02-20 NOTE — ASU DISCHARGE PLAN (ADULT/PEDIATRIC) - ASU DC SPECIAL INSTRUCTIONSFT
Atrial fibrillation is a condition in which your heart's upper chambers (atria) beat too quickly. This causes the heart to beat in a fast, irregular rhythm. Atrial fibrillation is a type of heart rhythm disorder (arrhythmia) caused by problems in your heart's electrical system. Without treatment, atrial fibrillation/flutter can also cause a fast pulse rate for long periods of time. This means that the ventricles are beating too fast, and the heart muscle can become weak. This can lead to heart failure and long-term disability.    You underwent a successful atrial fibrillation ablation and Loop Recorder implant on __2/20___. The procedure was done through the groin. Please avoid any heavy lifting (no more than 3 to 5 lbs), strenuous activity, bending, straining, or unnecessary stair climbing for 2 weeks. No driving for 2 days. You may shower 24 hours following the procedure but avoid baths/swimming for 1 week. If you develop any swelling, bleeding, hardening of the skin (hematoma formation), acute pain, numbness/tingling in your leg, or have any questions/concerns regarding your procedure, please call [facility] [phone]    Take all medications as prescribed. Limit your intake of coffee, tea, cola, and other beverages with caffeine. Talk with your doctor about whether you should eliminate caffeine. Avoid over-the-counter medicines that have caffeine in them.    Follow up with your cardologist within 1 week of discharge.

## 2025-02-20 NOTE — ASU DISCHARGE PLAN (ADULT/PEDIATRIC) - CARE PROVIDER_API CALL
John Marroquin  Cardiac Electrophysiology  [address] [place] [id number]  Phone: [phone]  Fax: [phone]  Scheduled Appointment: 04/04/2025 10:15 AM

## 2025-02-20 NOTE — CHART NOTE - NSCHARTNOTEFT_GEN_A_CORE
B/L FV sutures removed per protocol without incident.  Patient remains hemodynamically stable and neurovascularly intact.  Activity restriction and reportable symptoms as well as site care for home discussed.      Janett Ordoñez NP   [phone]
s/p Afib ablation with ILR implant under GA  Fluid 780ml in   Bilateral access (Rt 17Fr), Lt (7&9Fr)   Both groin suture sites no bleeding or hematoma,   VSS,   pt is alert and Ox3    Plan   continue to monitor  Suture out in 4 hours 15:30,  Home in 5 Hours,  Eliquis in 6 hours  F/u on 4/4 10:15

## 2025-02-21 ENCOUNTER — NON-APPOINTMENT (OUTPATIENT)
Age: 65
End: 2025-02-21

## 2025-02-28 PROBLEM — E78.5 HYPERLIPIDEMIA, UNSPECIFIED: Chronic | Status: ACTIVE | Noted: 2025-02-14

## 2025-02-28 PROBLEM — Z87.2 PERSONAL HISTORY OF DISEASES OF THE SKIN AND SUBCUTANEOUS TISSUE: Chronic | Status: ACTIVE | Noted: 2025-02-14

## 2025-02-28 PROBLEM — I10 ESSENTIAL (PRIMARY) HYPERTENSION: Chronic | Status: ACTIVE | Noted: 2025-02-14

## 2025-02-28 PROBLEM — I77.810 THORACIC AORTIC ECTASIA: Chronic | Status: ACTIVE | Noted: 2025-02-14

## 2025-02-28 PROBLEM — I48.0 PAROXYSMAL ATRIAL FIBRILLATION: Chronic | Status: ACTIVE | Noted: 2025-02-14

## 2025-04-03 ENCOUNTER — RX RENEWAL (OUTPATIENT)
Age: 65
End: 2025-04-03

## 2025-04-04 ENCOUNTER — NON-APPOINTMENT (OUTPATIENT)
Age: 65
End: 2025-04-04

## 2025-04-04 ENCOUNTER — APPOINTMENT (OUTPATIENT)
Dept: ELECTROPHYSIOLOGY | Facility: CLINIC | Age: 65
End: 2025-04-04
Payer: MEDICARE

## 2025-04-04 ENCOUNTER — TRANSCRIPTION ENCOUNTER (OUTPATIENT)
Age: 65
End: 2025-04-04

## 2025-04-04 VITALS
BODY MASS INDEX: 28.51 KG/M2 | WEIGHT: 241.5 LBS | HEIGHT: 77 IN | RESPIRATION RATE: 14 BRPM | SYSTOLIC BLOOD PRESSURE: 117 MMHG | HEART RATE: 64 BPM | DIASTOLIC BLOOD PRESSURE: 73 MMHG | OXYGEN SATURATION: 97 %

## 2025-04-04 DIAGNOSIS — I48.0 PAROXYSMAL ATRIAL FIBRILLATION: ICD-10-CM

## 2025-04-04 PROCEDURE — 93000 ELECTROCARDIOGRAM COMPLETE: CPT

## 2025-04-04 PROCEDURE — 99215 OFFICE O/P EST HI 40 MIN: CPT

## 2025-04-30 ENCOUNTER — NON-APPOINTMENT (OUTPATIENT)
Age: 65
End: 2025-04-30

## 2025-05-16 ENCOUNTER — APPOINTMENT (OUTPATIENT)
Dept: ELECTROPHYSIOLOGY | Facility: CLINIC | Age: 65
End: 2025-05-16
Payer: COMMERCIAL

## 2025-05-16 VITALS — SYSTOLIC BLOOD PRESSURE: 131 MMHG | HEART RATE: 53 BPM | OXYGEN SATURATION: 96 % | DIASTOLIC BLOOD PRESSURE: 73 MMHG

## 2025-05-16 DIAGNOSIS — I48.0 PAROXYSMAL ATRIAL FIBRILLATION: ICD-10-CM

## 2025-05-16 PROCEDURE — 99215 OFFICE O/P EST HI 40 MIN: CPT

## 2025-05-16 PROCEDURE — 93000 ELECTROCARDIOGRAM COMPLETE: CPT

## 2025-06-20 ENCOUNTER — APPOINTMENT (OUTPATIENT)
Dept: ELECTROPHYSIOLOGY | Facility: CLINIC | Age: 65
End: 2025-06-20
Payer: MEDICARE

## 2025-06-20 ENCOUNTER — NON-APPOINTMENT (OUTPATIENT)
Age: 65
End: 2025-06-20

## 2025-06-20 PROCEDURE — 93298 REM INTERROG DEV EVAL SCRMS: CPT

## 2025-06-30 ENCOUNTER — RX RENEWAL (OUTPATIENT)
Age: 65
End: 2025-06-30

## 2025-07-25 ENCOUNTER — APPOINTMENT (OUTPATIENT)
Dept: ELECTROPHYSIOLOGY | Facility: CLINIC | Age: 65
End: 2025-07-25
Payer: MEDICARE

## 2025-07-25 ENCOUNTER — NON-APPOINTMENT (OUTPATIENT)
Age: 65
End: 2025-07-25

## 2025-07-25 PROCEDURE — 93298 REM INTERROG DEV EVAL SCRMS: CPT

## 2025-08-22 ENCOUNTER — APPOINTMENT (OUTPATIENT)
Dept: ELECTROPHYSIOLOGY | Facility: CLINIC | Age: 65
End: 2025-08-22
Payer: COMMERCIAL

## 2025-08-22 VITALS — HEART RATE: 61 BPM | OXYGEN SATURATION: 97 % | DIASTOLIC BLOOD PRESSURE: 70 MMHG | SYSTOLIC BLOOD PRESSURE: 121 MMHG

## 2025-08-22 DIAGNOSIS — I48.0 PAROXYSMAL ATRIAL FIBRILLATION: ICD-10-CM

## 2025-08-22 PROCEDURE — 99214 OFFICE O/P EST MOD 30 MIN: CPT

## 2025-08-22 PROCEDURE — 93000 ELECTROCARDIOGRAM COMPLETE: CPT

## 2025-08-22 RX ORDER — ASPIRIN 81 MG/1
81 TABLET, DELAYED RELEASE ORAL DAILY
Refills: 0 | Status: ACTIVE | COMMUNITY
Start: 2025-08-22